# Patient Record
Sex: FEMALE
[De-identification: names, ages, dates, MRNs, and addresses within clinical notes are randomized per-mention and may not be internally consistent; named-entity substitution may affect disease eponyms.]

---

## 2022-10-11 ENCOUNTER — APPOINTMENT (OUTPATIENT)
Dept: ORTHOPEDIC SURGERY | Facility: CLINIC | Age: 72
End: 2022-10-11

## 2022-10-11 VITALS — HEIGHT: 67 IN

## 2022-10-11 DIAGNOSIS — M17.11 UNILATERAL PRIMARY OSTEOARTHRITIS, RIGHT KNEE: ICD-10-CM

## 2022-10-11 DIAGNOSIS — M17.12 UNILATERAL PRIMARY OSTEOARTHRITIS, LEFT KNEE: ICD-10-CM

## 2022-10-11 PROBLEM — Z00.00 ENCOUNTER FOR PREVENTIVE HEALTH EXAMINATION: Status: ACTIVE | Noted: 2022-10-11

## 2022-10-11 PROCEDURE — 72100 X-RAY EXAM L-S SPINE 2/3 VWS: CPT

## 2022-10-11 PROCEDURE — 73562 X-RAY EXAM OF KNEE 3: CPT | Mod: 50

## 2022-10-11 PROCEDURE — 99204 OFFICE O/P NEW MOD 45 MIN: CPT

## 2022-10-11 RX ORDER — CELECOXIB 200 MG/1
200 CAPSULE ORAL
Qty: 30 | Refills: 0 | Status: ACTIVE | COMMUNITY
Start: 2022-10-11 | End: 1900-01-01

## 2022-10-11 RX ORDER — HYALURONATE SODIUM, STABILIZED 88 MG/4 ML
88 SYRINGE (ML) INTRAARTICULAR
Qty: 2 | Refills: 0 | Status: ACTIVE | OUTPATIENT
Start: 2022-10-11

## 2022-10-11 NOTE — REASON FOR VISIT
[Knee Pain] : knee pain [Other: ____] : [unfilled] [Initial Visit] : an initial visit for [FreeTextEntry2] : Pt is complaining of lower back pain she feels whenever she is walking and in resting she feeling nothing. she also states her knees are both aching the pain is more isolated to the back and side of the knee caps.

## 2022-10-11 NOTE — ASSESSMENT
[M17.12] : open [M17.11] : supprative [FreeTextEntry1] : Patient has diagnosis of bilateral knee osteoarthritis moderate to severe as well as moderate lumbar degenerative arthritis.

## 2022-10-11 NOTE — DISCUSSION/SUMMARY
[Medication Risks Reviewed] : Medication risks reviewed [Surgical risks reviewed] : Surgical risks reviewed [de-identified] : We had a long discussion about the underlying etiology of the patient's bilateral knee pain as well as low back pain.  Patient has a established diagnosis of severe osteoarthritis of both knees.  She also has a diagnosis of moderate degenerative changes of the lumbar spine.  We talked at length about conservative measures we will place the patient on Celebrex 200 mg p.o. twice daily for 6-day course can be taken thereafter as needed.  Reasonable risks and benefits of medication were discussed in detail including potential side effects.  Patient's current medications were reviewed there is no contraindication to its intermittent use.  Patient defers on cortisone injections today.\par \par We talked at length about how she may have to require knee replacement surgery due to the advanced radiographic and clinical symptoms of both knees more so on the left.  We touched on the reasonable risks and benefits of the medication as well as typical convalescence expectations and typical convalescence expectations as well.\par \par I have advised her that when she meets with her primary care physician to consider use of Ozempic in order to help decrease her BMI.  BMI reduction will afford her the benefit of probable decreased symptoms in both knees and furthermore reduce perioperative potential complications at the time of knee replacement surgery.\par \par As a temporizing measure we have ordered HA injections for both knees patient will be notified once they are available for use.\par \par Plan.  Patient was placed on Celebrex 200 mg p.o. twice daily for 6-day course for both her mechanical back symptoms as well as bilateral knee arthritis.  We will really assess her symptoms when she returns for her HA injections.

## 2022-10-11 NOTE — PHYSICAL EXAM
[de-identified] : Lumbar spine patient has mild paraspinal tenderness palpation of the right and left-sided paraspinal lumbar musculature.  She has full passive internal and external rotation of both hips at 90 degrees with no restriction mechanical block or pain.  Negative straight leg raising test.  Neurovascular intact in both right and left lower extremity.\par \par Right knee definite medial joint line tenderness range of motion 0 to 125 degrees knee stable to AP stress.  Stress both full extension and 90 degrees of flexion.  There is warmth noted soft tissue but no obvious effusion.\par \par Left knee exam also definite medial joint line tenderness range of motion 0 to 125 degrees.  The knee is stable to AP stress varus valgus stress in both full extension and 90 degrees of flexion.  Warmth soft tissue swelling is also noted but no obvious effusion.  Both lower extremities have intact extensor mechanisms. [de-identified] : Radiographs of the lumbar spine were ordered today.  AP and lateral lumbar spine were obtained showing modest degenerative changes throughout the lumbar intervertebral spaces as well as facet joints.  There is no evidence of any acute trauma fracture or injury.\par \par Radiographs of the knees were ordered today.  AP standing individual lateral sunrise views were obtained showing severe osteoarthritis in both knees especially in the medial compartment.  Mild varus inclination of both knees noted.  Early secondary findings of osteoarthritis also noted such as osteophyte and cyst formation.

## 2022-10-11 NOTE — HISTORY OF PRESENT ILLNESS
[de-identified] : First-time visit for this patient she is here with complaint of low back pain and also bilateral left greater than right knee pain patient is experiencing both of these pain episodes for at least 6 months.  She recalls no specific accident injury or initiating traumatic event.  As far as the low back pain is concerned patient aches at night with prolonged standing also she denies any base radiating pains into the right or left lower extremity neuropathies or obvious weakness or numbness in the right or left lower extremity.  She reported no change in bowel or bladder habits.\par \par As far as the knees are concerned patient has medial sided discomfort she has difficulty with stair climbing getting out of a seated position.  Patient only takes Tylenol which improves her symptoms only somewhat temporarily.  Patient does have a remote history of having had HA injections in both knees in the past.

## 2022-11-10 ENCOUNTER — APPOINTMENT (OUTPATIENT)
Dept: ORTHOPEDIC SURGERY | Facility: CLINIC | Age: 72
End: 2022-11-10

## 2022-11-10 PROCEDURE — 20611 DRAIN/INJ JOINT/BURSA W/US: CPT | Mod: 50

## 2022-11-10 RX ORDER — MUPIROCIN 20 MG/G
2 OINTMENT TOPICAL
Qty: 22 | Refills: 0 | Status: ACTIVE | COMMUNITY
Start: 2022-07-12

## 2022-11-10 RX ORDER — CELECOXIB 200 MG/1
200 CAPSULE ORAL
Qty: 30 | Refills: 3 | Status: ACTIVE | COMMUNITY
Start: 2022-11-10 | End: 1900-01-01

## 2022-11-10 RX ORDER — HYDROCHLOROTHIAZIDE 25 MG/1
25 TABLET ORAL
Qty: 90 | Refills: 0 | Status: ACTIVE | COMMUNITY
Start: 2022-03-08

## 2022-11-10 RX ORDER — ACETAMINOPHEN, DEXTROMETHORPHAN HBR, DOXYLAMINE SUCCINATE 650; 30; 12.5 MG/30ML; MG/30ML; MG/30ML
32G X 4 MM LIQUID ORAL
Qty: 100 | Refills: 0 | Status: ACTIVE | COMMUNITY
Start: 2022-03-08

## 2022-11-10 RX ORDER — DICLOFENAC SODIUM 1% 10 MG/G
1 GEL TOPICAL
Qty: 100 | Refills: 0 | Status: ACTIVE | COMMUNITY
Start: 2022-03-08

## 2022-11-10 RX ORDER — COLESEVELAM HYDROCHLORIDE 625 MG/1
625 TABLET, FILM COATED ORAL
Qty: 180 | Refills: 0 | Status: ACTIVE | COMMUNITY
Start: 2022-03-08

## 2022-11-10 RX ORDER — ACLIDINIUM BROMIDE 400 UG/1
400 POWDER, METERED RESPIRATORY (INHALATION)
Qty: 2 | Refills: 0 | Status: ACTIVE | COMMUNITY
Start: 2022-10-20

## 2022-11-10 NOTE — PROCEDURE
[de-identified] : MONOVISC\par [employer]par NDC [address]-88\par LOT #[id number]\par EXP 2025/06/30\par 4ML/22MG\par \par Patient is given bilateral knee Monovisc injections.  This was done under sterile conditions and ultrasound guidance to the lateral compartment of each knee.  Patient tolerated the injections well. [FreeTextEntry1] : \par

## 2022-11-10 NOTE — DISCUSSION/SUMMARY
[Surgical risks reviewed] : Surgical risks reviewed [de-identified] : Patient will ice the knees today here in the office again tonight if symptomatic.  She will monitor her symptoms and follow-up as needed.\par \par Patient I had a discussion about other conservative measures that may be employed to reduce her knee discomfort and need to consider knee replacement surgery.  We touched on the use of Celebrex and anti-inflammatory.  The reasonable risk and benefits of that were discussed including potential side effects.  Reviewed patient's current medication profile and there seems to be no contraindication to its limited intermittent use.  We will place patient on a 5-day course of 200 mg p.o. twice daily then to be taken thereafter as needed.\par \par We also talked at length about reducing the patient's BMI.  Patient I had a long discussion about having her have a conversation with her primary care physician for potentially being prescribed Ozempic to help reduce weight.  Loss of weight will hopefully decrease her symptoms and also potentially minimize any perioperative complications with knee replacement surgery.  We also talked at length about the reasonable risk benefits of knee replacement surgery.\par \par Surgical risks reviewed. The reasonable risks and benefits of knee replacement surgery discussed in detail with the patient. Patient asked appropriate questions which were answered to their satisfaction. We talked about potential complications including complications they may require revision surgery such as component loosening failure migration wear and also potential complications of infection and stiffness.\par \par

## 2022-11-10 NOTE — REASON FOR VISIT
[Follow-Up Visit] : a follow-up visit for [Knee Pain] : knee pain [FreeTextEntry2] : [facility] knee monovisc gel inj

## 2022-11-10 NOTE — HISTORY OF PRESENT ILLNESS
[de-identified] : Patient returns today for application of bilateral Monovisc injections.  Recall the patient has an underlying diagnosis of severe bilateral knee osteoarthritis.

## 2022-11-10 NOTE — PHYSICAL EXAM
[de-identified] : Lumbar spine patient has mild paraspinal tenderness palpation of the right and left-sided paraspinal lumbar musculature.  She has full passive internal and external rotation of both hips at 90 degrees with no restriction mechanical block or pain.  Negative straight leg raising test.  Neurovascular intact in both right and left lower extremity.\par \par Right knee definite medial joint line tenderness range of motion 0 to 125 degrees knee stable to AP stress.  Stress both full extension and 90 degrees of flexion.  There is warmth noted soft tissue but no obvious effusion.\par \par Left knee exam also definite medial joint line tenderness range of motion 0 to 125 degrees.  The knee is stable to AP stress varus valgus stress in both full extension and 90 degrees of flexion.  Warmth soft tissue swelling is also noted but no obvious effusion.  Both lower extremities have intact extensor mechanisms.

## 2023-04-06 ENCOUNTER — APPOINTMENT (OUTPATIENT)
Dept: ORTHOPEDIC SURGERY | Facility: CLINIC | Age: 73
End: 2023-04-06
Payer: MEDICARE

## 2023-04-06 PROCEDURE — 99214 OFFICE O/P EST MOD 30 MIN: CPT

## 2023-04-06 NOTE — REASON FOR VISIT
[Follow-Up Visit] : a follow-up visit for [Knee Pain] : knee pain [FreeTextEntry2] : [facility] knee pain.

## 2023-04-06 NOTE — HISTORY OF PRESENT ILLNESS
[de-identified] : Patient returns today she has resumption of bilateral knee pain, history of advanced medial compartment osteoarthritis of both knees.  She was given November of last year HA injections but her symptoms have returned.

## 2023-04-06 NOTE — DISCUSSION/SUMMARY
[de-identified] : Patient I discussed at length the need for her to continue to try to reduce her BMI/weight.  We talked about weight reduction/BMI reduction hypertension her current symptoms and also help reduce potential perioperative complications if and when knee replacement surgery was to be undertaken.  Patient will follow-up as needed.  She was given a target date to return to we will order HA injections.\par \par Today's consultation lasted 35 minutes.

## 2023-04-06 NOTE — PHYSICAL EXAM
[de-identified] : \par \par Right knee definite medial joint line tenderness range of motion 0 to 125 degrees knee stable to AP stress.  Stress both full extension and 90 degrees of flexion.  There is warmth noted soft tissue but no obvious effusion.\par \par Left knee exam also definite medial joint line tenderness range of motion 0 to 125 degrees.  The knee is stable to AP stress varus valgus stress in both full extension and 90 degrees of flexion.  Warmth soft tissue swelling is also noted but no obvious effusion.  Both lower extremities have intact extensor mechanisms.

## 2023-05-11 RX ORDER — HYALURONATE SODIUM, STABILIZED 88 MG/4 ML
88 SYRINGE (ML) INTRAARTICULAR
Qty: 2 | Refills: 0 | Status: ACTIVE | OUTPATIENT
Start: 2023-05-11

## 2023-05-25 ENCOUNTER — APPOINTMENT (OUTPATIENT)
Dept: ORTHOPEDIC SURGERY | Facility: CLINIC | Age: 73
End: 2023-05-25
Payer: MEDICARE

## 2023-05-25 DIAGNOSIS — E66.9 OBESITY, UNSPECIFIED: ICD-10-CM

## 2023-05-25 PROCEDURE — 99213 OFFICE O/P EST LOW 20 MIN: CPT | Mod: 25

## 2023-05-25 PROCEDURE — 20611 DRAIN/INJ JOINT/BURSA W/US: CPT | Mod: 50

## 2023-05-25 NOTE — PHYSICAL EXAM
[de-identified] : \par Patient has a BMI in the office calculated today of 38.\par \par \par Right knee definite medial joint line tenderness range of motion 0 to 125 degrees knee stable to AP stress.  Stress both full extension and 90 degrees of flexion.  There is warmth noted soft tissue but no obvious effusion.\par \par Left knee exam also definite medial joint line tenderness range of motion 0 to 125 degrees.  The knee is stable to AP stress varus valgus stress in both full extension and 90 degrees of flexion.  Warmth soft tissue swelling is also noted but no obvious effusion.  Both lower extremities have intact extensor mechanisms.

## 2023-05-25 NOTE — PROCEDURE
[de-identified] : MONOVISC\par [facility] [employer]par NDC [address]-59\par LOT # [id number]\par EXP 2023/07/31\par 4ML/22MG\par \par Patient is given bilateral Monovisc injections today. [FreeTextEntry1] : \par

## 2023-05-25 NOTE — REASON FOR VISIT
[Follow-Up Visit] : a follow-up visit for [Knee Pain] : knee pain [FreeTextEntry2] : BILATERAL KNEE MONOVISC GEL INJECTION

## 2023-05-25 NOTE — HISTORY OF PRESENT ILLNESS
[de-identified] : Patient returns today she states she is improving her weight loss she has recently begun Ozempic.  She is here to discuss further treatment options to help reduce her discomfort and prevent knee replacement surgery.

## 2023-05-25 NOTE — DISCUSSION/SUMMARY
[de-identified] : We talked at length about how it is important to help try to reduce her BMI/weight.  Patient's BMI calculated today is 38.  Weight reduction/BMI reduction will help improve the efficacy of today's injections also to hopefully decrease her symptoms and potentially delay the need for surgery.\par \par Weight/BMI reduction will also help reduce perioperative complications if and when knee replacement surgery needs to be undertaken.  Patient will follow-up as needed.\par \par Today's consultation lasted 25 minutes